# Patient Record
Sex: FEMALE | Race: WHITE
[De-identification: names, ages, dates, MRNs, and addresses within clinical notes are randomized per-mention and may not be internally consistent; named-entity substitution may affect disease eponyms.]

---

## 2019-02-05 ENCOUNTER — HOSPITAL ENCOUNTER (EMERGENCY)
Dept: HOSPITAL 46 - ED | Age: 64
Discharge: HOME | End: 2019-02-05
Payer: MEDICARE

## 2019-02-05 VITALS — WEIGHT: 200 LBS | HEIGHT: 69 IN | BODY MASS INDEX: 29.62 KG/M2

## 2019-02-05 DIAGNOSIS — Z79.899: ICD-10-CM

## 2019-02-05 DIAGNOSIS — Z90.710: ICD-10-CM
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
## 2019-02-05 DIAGNOSIS — I10: ICD-10-CM

## 2019-02-05 DIAGNOSIS — Z88.0: ICD-10-CM

## 2019-02-05 DIAGNOSIS — S93.402A: Primary | ICD-10-CM

## 2019-02-05 DIAGNOSIS — Z88.1: ICD-10-CM

## 2021-10-26 NOTE — EKG
[facility]
                                    [address]
                                  [place]  [address]
_________________________________________________________________________________________
                                                                 Signed   
 
 
Sinus rhythm with occasional premature ventricular complexes
Otherwise normal ECG
When compared with ECG of 07-JUN-2017 10:17,
premature ventricular complexes are now present
Confirmed by BUCK GRANT DO ([id number]) on 10/26/2021 3:13:47 PM
 
 
 
 
 
 
 
 
 
 
 
 
 
 
 
 
 
 
 
 
 
 
 
 
 
 
 
 
 
 
 
 
 
 
 
 
 
 
 
 
    Electronically Signed By: BUCK GRANT DO  10/26/21 1513
_________________________________________________________________________________________
PATIENT NAME:     JENNY KNOX                 
MEDICAL RECORD #: [id number]                     Electrocardiogram             
          ACCT #: [id number]  
DATE OF BIRTH:   [birth year]                                       
PHYSICIAN:   BUCK GRANT DO                     REPORT #: [id number]
REPORT IS CONFIDENTIAL AND NOT TO BE RELEASED WITHOUT AUTHORIZATION

## 2022-09-18 NOTE — XMS
PreManage Notification: JENNY KNOX MRN:[id number]
 
Security Information
 
Security Events
No recent Security Events currently on file
 
 
 
CRITERIA MET
------------
- [facility]
 
 
CARE PROVIDERS
There are no care providers on record at this time.
 
Diann has no Care Guidelines for this patient.
 
FLORECITA VISIT COUNT (12 MO.)
-------------------------------------------------------------------------------------
3 [facility] Dum[facility]
-------------------------------------------------------------------------------------
TOTAL 3
-------------------------------------------------------------------------------------
NOTE: Visits indicate total known visits.
 
ED/C VISIT TRACKING (12 MO.)
-------------------------------------------------------------------------------------
09/18/2022 08:59
[facility]
 
TYPE: Emergency
 
COMPLAINT:
- LT LEG INJ
-------------------------------------------------------------------------------------
01/08/2022 10:01
[facility]
 
TYPE: Emergency
 
COMPLAINT:
- L KNEE PAIN
 
DIAGNOSES:
- Sprain of unspecified site of left knee, initial encounter
- Allergy status to penicillin
- Allergy status to other antibiotic agents
- Essential (primary) hypertension
- Pain in left knee
- Exposure to other specified factors, initial encounter
- Other long term (current) drug therapy
-------------------------------------------------------------------------------------
10/26/2021 12:26
[facility]
 
TYPE: Emergency
 
 
COMPLAINT:
- CHEST PAIN
 
DIAGNOSES:
- Allergy status to penicillin
- Essential (primary) hypertension
- Allergy status to other antibiotic agents
- Other long term (current) drug therapy
- Other chest pain
-------------------------------------------------------------------------------------
 
 
INPATIENT VISIT TRACKING (12 MO.)
No inpatient visits to display in this time frame
 
[email or web address]

## 2023-02-24 NOTE — NUR
LE 0925: PT HAS MET ALL DC CRITERIA, SHE INDICATES THAT SHE WOULD LIKE TO GO
HOME AT THIS TIME. SHE IS EDUCATED ON HOW TO BEST DRESS HERSELF AND TO OPEN
HER CURTAIN WHEN READY.
 
LE 0932: PT AND PARTNER ARE GIVEN WRITTEN AND VERBAL DC INSTRUCTIONS.
QUESTIONS ARE ASKED AND ANSWERED. PT IS TAKEN TO PERSONAL VEHICLE VIA WC,
WHERE SHE IS ABLE TO TRANSFER HERSELF WITHOUT ISSUES.

## 2023-02-24 NOTE — OR
[facility]
                                    [address] [place] [place], [place]  [address]
_________________________________________________________________________________________
                                                                 Signed   
 
 
DATE OF OPERATION:
02/24/2023
 
SURGEON:
Juancho Reeves MD
 
PREOPERATIVE DIAGNOSIS:
Lateral meniscus tear, left knee.
 
POSTOPERATIVE DIAGNOSIS:
Lateral meniscus tear, left knee.
 
PROCEDURE PERFORMED:
Left knee arthroscopy with partial lateral meniscectomy.
 
ASSISTANT:
None.
 
ANESTHESIA:
General.
 
BLOOD LOSS:
Minimal.
 
BRIEF HISTORY:
Lubna is a 68-year-old female with well-preserved joint space, but significant pain and
locking.  Her MRI did show lateral meniscus tear fairly good-sized.  Risks and benefits
of operative treatment were discussed with her and she elected to proceed. Once consent
was obtained, she was taken to the operating room. After adequate anesthesia, she was
placed on the operating room table.  All downside pressure points were well padded.  The
right leg was flexed, abducted and externally rotated on a well-padded leg heredia. The
left was placed in a well-padded proximal thigh leg heredia.  The leg was then prepped
and draped in a standard sterile fashion.  Portal sites were injected using 0.25%
Marcaine with epinephrine.  Standard inferolateral and superolateral portals were made
and the scope was introduced in the knee. 
 
ARTHROSCOPIC FINDINGS:
The patella was noted to track well.  There was mild chondromalacia.  Medial and lateral
gutters were clear.  The ACL and PCL were intact.  Medial compartment was intact with no
significant chondromalacia.  The lateral compartment showed grade 2 chondromalacia
throughout the lateral femoral condyle and grade 1 to 2 changes on the tibial side.
There was a complex tear extending from the posterior horn to the midlateral body.  This
 
    Electronically Signed By: JUANCHO REEVES MD  02/24/23 1103
_________________________________________________________________________________________
PATIENT NAME:     LUBNA KNOX                 
MEDICAL RECORD #: [id number]            OPERATIVE REPORT              
          ACCT #: [id number]  
DATE OF BIRTH:   [birth year]            REPORT #: [id number]      
PHYSICIAN:        JUANCHO REEVES MD              
PCP:              JAMIE VU              
REPORT IS CONFIDENTIAL AND NOT TO BE RELEASED WITHOUT AUTHORIZATION
 
 
                                  [facility]
                                    [address] [place], [place]  [address]
_________________________________________________________________________________________
                                                                 Signed   
 
 
had both radial, horizontal and degenerative components. 
 
DESCRIPTION OF OPERATION:
Standard inferomedial portal was established after localization using a spinal needle.
The straight and curved biters were then used to trim the meniscus tear back to a stable
rim.  The meniscus was then smoothed and feathered out using the shaver.  Chondral flaps
on the femur were then shaved as well.  All debris was evacuated.  The scope was then
withdrawn.  Portals were closed with 3-0 nylon and the knee was injected with 60 mg of
Toradol.  Wounds were dressed with Adaptic, ABD and Ace wrap.  She tolerated the
procedure well.  All sponge, needle, and instrument counts were correct. 
 
 
 
            ________________________________________
            MD SWATHI Stack/MODL
Job #:  [id number]
DD:  02/24/2023 07:29:29
DT:  02/24/2023 08:34:34
 
 
Copies:                                
~
 
 
 
 
 
 
 
 
 
 
 
 
 
 
 
 
 
 
    Electronically Signed By: JUANCHO REEVES MD  02/24/23 1103
_________________________________________________________________________________________
PATIENT NAME:     LUBNA KNOX                 
MEDICAL RECORD #: [id number]            OPERATIVE REPORT              
          ACCT #: [id number]  
DATE OF BIRTH:   [birth year]            REPORT #: [id number]      
PHYSICIAN:        JUANCHO REEVES MD              
PCP:              JAMIE VU              
REPORT IS CONFIDENTIAL AND NOT TO BE RELEASED WITHOUT AUTHORIZATION

## 2023-02-24 NOTE — NUR
PT TAKEN TO OR FOR SURGERY, FAMILY REMAINING IN RM WAITING. SEEMS INFORMED,
ALL QUESTIONS ASKED ANSWERED. GAVE BLESSING AND WILL FOLLOW AS NEEDED

## 2023-02-24 NOTE — NUR
PT IS BACK TO  FROM PACU. SHE IS AWAKE AND ORIENTED. SHE REPORTS PAIN 8/10,
BUT DOES NOT WANT NARCOTICS. SIGNIFICANT OTHER IS AT THE BEDSIDE. CALL LIGHT
WITHIN REACH. TOLERATIN SIPS OF WATER. NO ADDITIONAL NEEDS OR CONCERNS AT
THIS TIME.

## 2023-02-24 NOTE — NUR
02/24/23 0743 Sanjuana Figueroa
0730- PT ARRIVES TO PACU AWAKE AND TALKING. PT REPORTS LEFT KNEE PAIN
THAT SHE RATES A 6/10. CRNA AT THE BEDSIDE AND GIVES PT PAIN
MEDICATION. PT DENIES NAUSEA. RESP EVEN AND UNLABORED. OXYGEN SAT HIGH
90'S % ON RA. ICE PACK APPLIED TO PT'S LEFT KNEE WITH DRESSING
IN BETWEEN ICE PACK AND SKIN.

## 2023-02-24 NOTE — NUR
SOL 0910: PT IS DOING WELL, STATES HER PAIN HAS COME DOWN FROM AN 8 TO A 7. SHE
IS ABLE TO GET UP OOB AND AMBULATE WITH STAND BY ASSIST TO THE BATHROOM, WHERE
SHE VOIDS 200MLS OF DARK YELLO URINE. SHE REQUESTS A PAIN PILL AT THIS TIME.
SHE IS TOLERATING BOTH WATER AND JELLO. PARTNER STILL AT BEDSIDE. CALL LIGHT
REMAINS WITHIN REACH.

## 2023-05-05 NOTE — OR
[facility]
                                    [address], [place]  [address]
_________________________________________________________________________________________
                                                                 Signed   
 
 
DATE OF OPERATION:
05/05/2023
 
SURGEON:
Lauren Bolden MD
 
PREOPERATIVE DIAGNOSES:
1. Screening.
2. Internal anal skin tags.
3. Hyperplastic colonic polyps in 2012 at age 57.
 
POSTOPERATIVE DIAGNOSES:
1. 4 mm polyp, mid colon.
2. 5 mm polyp at 8 cm in proximal left colon.
3. 7 mm sessile polyp at hepatic flexure.
4. 3 mm polyp at 7 cm in rectum.
5. Minimal internal hemorrhoids with associated skin tags.
 
PROCEDURE:
Colonoscopy with hot biopsy.
 
ESTIMATED BLOOD LOSS:
None.
 
INDICATIONS:
Lubna is a 68-year-old female, asked to see me for followup colonoscopy.  I helped her
for screening colonoscopy in 2012 at the age 57.  She had three small hyperplastic
polyps removed.  She had internal anal skin tags.  She is also very sensitive to
anesthetics and had quite a bit of postoperative gas pain.  This is despite the fact
that she had monitored anesthesia care.  We had asked her to follow up in 10 years.  She
currently has no lower GI complaints.  There is no family history of colon cancer or
polyps.  In the office I had given her a pamphlet on colonoscopy and we reviewed the
nature of the test.  There is risk including, but not limited to gas bloating, crampy
abdominal pain, bleeding, perforation requiring surgery, and missed diagnosis.  We also
reviewed the written instructions for a bowel prep line by line.  In addition, we
reviewed her hemochromatosis as well as anxiety and her need for clonazepam and
lorazepam on a regular basis.  She also owns a local restaurant.  She has a couple of
drinks every day.  In addition, we know she is very sensitive to anesthetics as well as
postoperative gas pain.  Consequently, we asked for monitored anesthesia care once
again.  That proved to be a hwang decision.  When she woke up from the procedure, she was
already complaining about the gas pain.  She had expressed understanding and wished to
proceed. 
 
    Electronically Signed By: LAUREN BOLDEN MD  05/05/23 1402
_________________________________________________________________________________________
PATIENT NAME:     LUNBA KNOX                 
MEDICAL RECORD #: [id number]            OPERATIVE REPORT              
          ACCT #: [id number]  
DATE OF BIRTH:   [birth year]            REPORT #: [id number]      
PHYSICIAN:        LAUREN BOLDEN MD             
PCP:              BETINA VU              
REPORT IS CONFIDENTIAL AND NOT TO BE RELEASED WITHOUT AUTHORIZATION
 
 
                                  [facility]
                                    [address], [place]  [address]
_________________________________________________________________________________________
                                                                 Signed   
 
 
 
PROCEDURE IN DETAIL:
Lubna was taken in the endoscopy suite and placed in the left lateral decubitus
position.  She was given monitored anesthesia care with propofol per our nurse
anesthetist.  A digital rectal exam was performed and this was unremarkable.  She had
good sphincter tone.  No external hemorrhoids of any size.  There were no masses.  The
adult colonoscope was introduced, advanced all the way around into the cecum under
direct visualization of camera without difficulty.  It took some extra propofol and
abdominal compression and rotating her into the supine position in order to get the
camera directly into the cecum.  Her prep was good.  A little bit of liquid stool had to
be suctioned out, but otherwise quite satisfactory.  We could easily see the appendiceal
orifice and ileocecal valve.  The scope was then slowly withdrawn.  We used a hot biopsy
forceps to remove the above-mentioned polyps without difficulty.  We did not see any
diverticula.  Once in the rectum, the scope was retroflexed and she has very minimal
internal hemorrhoid columns with associated skin tags.  After this, the gas was
suctioned out.  The colonoscope removed.  Overall, Lubna tolerated the procedure well. 
 
RECOMMENDATIONS:
I will see Lubna back in my office in 7 to 14 days to review her results.
 
 
 
            ________________________________________
            Lauren Bolden MD 
 
 
ALB/MODL
Job #:  [id number]
DD:  05/05/2023 11:18:18
DT:  05/05/2023 11:41:11
 
cc:            MD Betina Newberry PA
 
 
Copies:  LAUREN BOLDEN MD, LINDA PA
~
 
 
 
 
    Electronically Signed By: LAUREN BOLDEN MD  05/05/23 1402
_________________________________________________________________________________________
PATIENT NAME:     LUBNA KNOX                 
MEDICAL RECORD #: [id number]            OPERATIVE REPORT              
          ACCT #: [id number]  
DATE OF BIRTH:   [birth year]            REPORT #: [id number]      
PHYSICIAN:        LAUREN BOLDEN MD             
PCP:              BETINA VU              
REPORT IS CONFIDENTIAL AND NOT TO BE RELEASED WITHOUT AUTHORIZATION

## 2023-05-10 NOTE — PATH
[facility]
                                    [address], [place]  [address]
_________________________________________________________________________________________
                                                                 Signed   
 
 
 
SPECIMEN(S): A DESCENDING/LEFT COLON POLYP
SPECIMEN(S): B PROX DESCENDING/LT COLON POLYP AT 80 CM
SPECIMEN(S): C HEPATIC FLEXURE POLYP
SPECIMEN(S): D COLON POLYP AT 70 CM
 
SPECIMEN SOURCE:
A. DESCENDING/LEFT COLON POLYP
B. PROX DESCENDING/LT COLON POLYP AT 80 CM
C. HEPATIC FLEXURE POLYP
D. COLON POLYP AT 70 CM
 
CLINICAL HISTORY:
Screening, history of hyperplastic polyps.  Post: Internal hemorrhoids, polyps 
x 4. 
 
FINAL PATHOLOGIC DIAGNOSIS:
A.  Colon, descending/left, polypectomy:
-  Hyperplastic polyp.
-  There is no evidence of dysplasia or malignancy.
B.  Colon, proximal descending/left, 80 cm, polypectomy:
-  Tubular adenoma.
-  There is no evidence of high-grade dysplasia or malignancy.
C.  Colon, hepatic flexure, polypectomy:
-  Hyperplastic polyps (two fragments).
-  There is no evidence of dysplasia or malignancy.
D.  Colon, 70 cm, polypectomy:
-  Hyperplastic polyp.
-  There is no evidence of dysplasia or malignancy.
TWK:emh:C2NR
 
MICROSCOPIC EXAMINATION:
Histologic sections of all submitted blocks are examined by light microscopy.  
These findings, together with the gross examination, support the pathologic 
diagnosis. 
 
GROSS DESCRIPTION:
A. The specimen, labeled and designated "Hanks, descending colon polyp," is 
received in formalin and consists of one tan soft tissue fragment, 0.2 cm. 
Entirely submitted in (A1). 
B. The specimen, labeled and designated "Hanks, proximal descending colon polyp 
at 80 cm," is received in formalin and consists of one tan soft tissue 
 
                                                                                    
_________________________________________________________________________________________
PATIENT NAME:     JENNY KNOX                 
MEDICAL RECORD #: [id number]            PATHOLOGY                     
          ACCT #: [id number]       ACCESSION #: [id number]     
DATE OF BIRTH:   [birth year]            REPORT #: [id number]       
PHYSICIAN:        FRACISCO MELVIN              
PCP:              JAMIE VU              
REPORT IS CONFIDENTIAL AND NOT TO BE RELEASED WITHOUT AUTHORIZATION
 
 
                                  [facility]
                                    [address], [place]  [address]
_________________________________________________________________________________________
                                                                 Signed   
 
 
fragment, 0.2 cm. Entirely submitted in (B1). 
C. The specimen, labeled and designated "Deyanira, hepatic flexure polyp," is 
received in formalin and consists of two tan soft tissue fragments, ranging 
from 0.1-0.2 cm. Entirely submitted in (C1). 
D. The specimen, labeled and designated "Deyanira, colon polyp at 70 cm," is 
received in formalin and consists of one tan soft tissue fragment, 0.1 cm. 
Entirely submitted in (D1). 
JS  (under the direct supervision of a pathologist)
 
The Gross Description was prepared using a voice recognition system. The report 
was reviewed for accuracy; however, sound-alike word errors, addition and/or 
deletions may occur. If there is any 
question about this report, please contact Client Services.
 
PERFORMING LABORATORY:
The technical component was performed by [facility], [address] [place] [id number] (CLIA# [id number]).  The professional interpretation was 
performed by [facility], Located within [facility], [address] [place] [place] [address] (CLIA#:  
[id number]). 
 
Diagnostician:  Matt Gil MD
Pathologist
Electronically Signed 05/09/2023
 
 
Copies:                                
~
 
 
 
 
 
 
 
 
 
 
 
 
 
 
 
                                                                                    
_________________________________________________________________________________________
PATIENT NAME:     JENNY KNOX                 
MEDICAL RECORD #: [id number]            PATHOLOGY                     
          ACCT #: [id number]       ACCESSION #: [id number]     
DATE OF BIRTH:   [birth year]            REPORT #: [id number]       
PHYSICIAN:        FRACISCO MELVIN              
PCP:              JAMIE VU              
REPORT IS CONFIDENTIAL AND NOT TO BE RELEASED WITHOUT AUTHORIZATION